# Patient Record
(demographics unavailable — no encounter records)

---

## 2025-01-07 NOTE — HISTORY OF PRESENT ILLNESS
[FreeTextEntry1] : This is a 48 y/o male with a pmhx of elevated cholesterol, anemia, Vitamin D def here today for a follow up.  Patient c/o numbness and tingling of bilateral hands which is been going on for the past few months,has seen neuro in the past never started physical therapy  Patient reports occasional tightness of his chest happens randomly Reports snoring and also drooling at night  Patient denies  , dyspnea, palpitations, dizziness, syncope, changes in bowel/bladder habits or appetite.

## 2025-03-04 NOTE — HISTORY OF PRESENT ILLNESS
[FreeTextEntry1] : This is a 50 y/o male with a pmhx of elevated cholesterol, anemia, Vitamin D def here today for a follow up. Last seen 1/7/25 reporting chest pain and IBRAHIM. Echo 2/2025 with normal EF. Patient was advised for STN but has not been done. Patient was referred to see neuro due to tingling and numbness in bilateral hand, and to see pulm due to snoring. He still reports of intermittent chest pain and IBRAHIM. He reports of swollen gland under tongue which has been there for a while. Patient continues to report of tingling and numbness in bilateral hand.  Denies palpitations, diaphoresis, vision changes, HA, dizziness, syncope, cough, wheezing, edema, fever, chills, infection.

## 2025-03-04 NOTE — REVIEW OF SYSTEMS
[Negative] : Heme/Lymph [Dyspnea on exertion] : dyspnea during exertion [Chest Discomfort] : chest discomfort [FreeTextEntry4] : swelling  under tongue.

## 2025-04-01 NOTE — HISTORY OF PRESENT ILLNESS
[TextBox_4] : Mr. EDMUND BRIAN is a 49 year-old male, with history of asthma, shortness of breath, who presents to the office today for pulmonary follow up. Edmund reports that he has been experiencing exertional dyspnea and he also wheezes nocturnally. He denies sleep interruption. He denies any cough, sputum production, or chest pain. Edmund states that he has not used any inhaler in quite a while due to getting "irritations" in his mouth when he would take them. In the past, he did c/o developing sores in the mouth since using his inhaler.  Edmund is a former cigarette smoker.

## 2025-04-01 NOTE — ASSESSMENT
[FreeTextEntry1] : Dr. Grijalva reviewed with EDMUND his chest CT scan dated 01/21/2025 in office today. The study shows stable 2 mm right lower lobe nodule is thought to be benign.  Obtained and reviewed pulmonary function test and NiOx results with patient today. PFT and NiOx were remarkable for active asthma/inflammation.  Dr. Grijalva's recommendation: At this point, Edmund should start taking Symbicort at a lower dosage of 80-4.5 mcg/act, two puffs BID, for asthma treatment. Edmund should undergo repeat chest CT scan in January 2026 for yearly surveillance of his lung nodule. Return for pulmonary follow up in 6 weeks.  Patient educated on the proper use of Symbicort and was strongly advised to rinse and gargle after taking to prevent oral thrush and/or hoarseness. Patient acknowledged understanding and competency in the procedure.

## 2025-04-01 NOTE — REASON FOR VISIT
[Follow-Up] : a follow-up visit [Asthma] : asthma [Shortness of Breath] : shortness of breath [Pulmonary Nodules] : pulmonary nodules

## 2025-04-01 NOTE — SIGNATURES
[TextEntry] : This note was written by Eric Daniels on 04/01/2025 acting as medical scribe for Dr. Coni Grijalva. I, Dr. Coni Grijalva, have read and attest that all the information, medical decision making and discharge instructions within are true and accurate.

## 2025-04-01 NOTE — PROCEDURE
[FreeTextEntry1] : Pulmonary Function Test performed today, 04/01/2025, in my office: Spirometry: Suggestive of mild restrictive defect. Lung Volume: Within normal limits with air trapping. Diffusion: Within normal limits. ____________________________________________________________ Exhaled Nitric Oxide             Final  No Documents Attached    	Test  	Result  	Flag	Reference	Goal	Last Verified  	Exhaled Nitric Oxide	36	 	 		REQUIRED  Ordered by: SHALA EM       Collected/Examined: 01Apr2025 10:33AM       Verification Required       Stage: Final       Performed at: In Office       Performed by: SHALA EM       Resulted: 01Apr2025 10:33AM       Last Updated: 01Apr2025 10:33AM       ____________________________________________________________ CT Chest No Cont             Final  No Documents Attached    	 EXAM: [id number] - CT CHEST  - ORDERED BY: MELISSA BORRERO   PROCEDURE DATE:  01/21/2025    INTERPRETATION:  INDICATION: Lung nodule follow-up  TECHNIQUE: A volumetric CT acquisition of the chest was obtained from the thoracic inlet to the upper abdomen without the use of intravenous contrast. Coronal and sagittal reconstructed images are provided.  COMPARISON: 4/19/2022  FINDINGS:  Lungs/Airways/Pleura: The central airways are patent. No pleural effusion. There is a stable 2 mm noncalcified right lower lobe nodule on series 2 image 73 as well as a 2 mm calcified nodule in the left lower lobe. The lungs are otherwise clear.  Mediastinum/Lymph nodes: No thoracic adenopathy. Small calcified left hilar node is likely sequela of old granulomatous disease.  Heart and Vessels: The great vessels are normal in size. The heart is normal in size. Mild coronary arterial calcification. No pericardial effusion.  Upper Abdomen: No acute abnormality.  Osseous structures and Soft Tissues: No aggressive bone lesions.   IMPRESSION:   No suspicious lesions.  Stable 2 mm right lower lobe nodule is thought to be benign  Mild coronary arterial calcification.    --- End of Report ---       CHRISTIAN MOTA MD; Attending Radiologist This document has been electronically signed. Jan 22 2025  7:18AM  Ordered by: MELISSA BORRERO       Collected/Examined: 21Jan2025 05:38PM       Verified by: ERA DAMON 22Jan2025 12:11PM       Result Communication: Discussed results with patient; Stage: Final       Performed at: [facility] [place]       Resulted: 22Jan2025 07:14AM       Last Updated: 22Jan2025 12:11PM       Accession: [id number]

## 2025-05-31 NOTE — HISTORY OF PRESENT ILLNESS
[TextBox_4] : Mr. EDMUND BRIAN is a 50 year-old male, with history of asthma, shortness of breath, who presents to the office today for pulmonary follow up. Edmund c/o intermittent bloating of 3-4 months now but denies any nausea, vomiting, abnormal bowel, appetite changes or urinary symptoms. Otherwise, EDMUND reports to be feeling reasonably well at this time with no respiratory complaints. EDMUND notes compliance in using Symbicort and is tolerating it well; denies thrush.   Edmund is a former cigarette smoker.

## 2025-05-31 NOTE — PROCEDURE
[FreeTextEntry1] : Pulmonary Function Test performed today, 05/30/2025, in my office:  Spirometry: suggestive of mild restrictive defect.  ____________________________________________________________ Exhaled Nitric Oxide             Final  No Documents Attached    	Test  	Result  	Flag	Reference	Goal	Last Verified  	Exhaled Nitric Oxide	15	 	 		REQUIRED  Ordered by: SHALA EM       Collected/Examined: [id number] 09:57AM       Verification Required       Stage: Final       Performed at: In Office       Performed by: SHALA EM       Resulted: [id number] 09:57AM       Last Updated: [id number] 09:59AM

## 2025-05-31 NOTE — ASSESSMENT
[FreeTextEntry1] : Obtained and reviewed spirometry and NiOx results with patient today. Spirometry was stable compared to baseline and NiOx was unremarkable.   Patient educated on the proper use of Symbicort and was strongly advised to rinse and gargle after taking to prevent oral thrush and/or hoarseness. Patient acknowledged understanding and competency in the procedure.  Dr. Grijalva's recommendation: At this point, EDMUND should see Dr. Reji Up for further evaluation in regard to his bloating of 3-4 months now. Edmund should continue taking Symbicort 80-4.5 mcg/act, two puffs BID, for asthma treatment. Edmund should undergo repeat chest CT scan in January 2026 for yearly surveillance of his lung nodule. EDMUND should return for pulmonary follow up in 3 months.

## 2025-05-31 NOTE — PROCEDURE
[FreeTextEntry1] : Pulmonary Function Test performed today, 05/30/2025, in my office:  Spirometry: suggestive of mild restrictive defect.  ____________________________________________________________ Exhaled Nitric Oxide             Final  No Documents Attached    	Test  	Result  	Flag	Reference	Goal	Last Verified  	Exhaled Nitric Oxide	15	 	 		REQUIRED  Ordered by: SHALA EM       Collected/Examined: [id number] 09:57AM       Verification Required       Stage: Final       Performed at: In Office       Performed by: SHALA EM       Resulted: [id number] 09:57AM       Last Updated: [id number] 09:59AM

## 2025-05-31 NOTE — SIGNATURES
[TextEntry] : This note was written by Andre Delgado on 05/30/2025 acting as medical scribe for Dr. Coni Grijalva. I, Dr. Coni Grijalva, have read and attest that all the information, medical decision making and discharge instructions within are true and accurate.